# Patient Record
Sex: FEMALE | Race: BLACK OR AFRICAN AMERICAN | NOT HISPANIC OR LATINO | Employment: UNEMPLOYED | URBAN - METROPOLITAN AREA
[De-identification: names, ages, dates, MRNs, and addresses within clinical notes are randomized per-mention and may not be internally consistent; named-entity substitution may affect disease eponyms.]

---

## 2017-01-17 ENCOUNTER — HOSPITAL ENCOUNTER (EMERGENCY)
Facility: HOSPITAL | Age: 27
Discharge: HOME | End: 2017-01-17

## 2017-01-17 VITALS
BODY MASS INDEX: 28.25 KG/M2 | HEIGHT: 67 IN | DIASTOLIC BLOOD PRESSURE: 116 MMHG | TEMPERATURE: 99 F | WEIGHT: 180 LBS | HEART RATE: 88 BPM | RESPIRATION RATE: 16 BRPM | OXYGEN SATURATION: 98 % | SYSTOLIC BLOOD PRESSURE: 155 MMHG

## 2017-01-17 DIAGNOSIS — K02.9 DENTAL CARIES: Primary | ICD-10-CM

## 2017-01-17 PROCEDURE — 99283 EMERGENCY DEPT VISIT LOW MDM: CPT

## 2017-01-17 RX ORDER — ACETAMINOPHEN 500 MG
1000 TABLET ORAL 3 TIMES DAILY PRN
Qty: 30 TABLET | Refills: 0
Start: 2017-01-17

## 2017-01-17 RX ORDER — PENICILLIN V POTASSIUM 500 MG/1
500 TABLET, FILM COATED ORAL EVERY 6 HOURS
Qty: 28 TABLET | Refills: 0 | Status: SHIPPED | OUTPATIENT
Start: 2017-01-17 | End: 2017-01-27

## 2017-01-17 RX ORDER — IBUPROFEN 200 MG
400 TABLET ORAL EVERY 6 HOURS PRN
Qty: 30 TABLET | Refills: 0
Start: 2017-01-17

## 2017-01-17 RX ORDER — DICLOFENAC SODIUM 75 MG/1
75 TABLET, DELAYED RELEASE ORAL 2 TIMES DAILY
Qty: 20 TABLET | Refills: 0 | Status: SHIPPED | OUTPATIENT
Start: 2017-01-17

## 2017-01-17 NOTE — ED PROVIDER NOTES
SCRIBE #1 NOTE: I, Albert Bautista, am scribing for, and in the presence of, CHEVY Starks. I have scribed the entire note.      History      Chief Complaint   Patient presents with    Dental Pain     cracked tooth causing intense pain to right side        Review of patient's allergies indicates:  No Known Allergies     HPI   HPI    1/17/2017, 4:51 PM   History obtained from the patient      History of Present Illness: Herman Gasca is a 26 y.o. female patient who presents to the Emergency Department for right upper dental pain which onset gradually 2 days. Symptoms are constant and moderate in severity. Pt reports pain with chewing. No mitigating or exacerbating factors reported. No associated sx reported. Patient denies any fever, chills, sore throat, trouble swallowing, ear pain, HA, lightheadedness, dizziness, and all other sxs at this time. No further complaints or concerns at this time.     Arrival mode: Personal vehicle     PCP: No primary care provider on file.       Past Medical History:  Past medical history reviewed not relevant      Past Surgical History:  Past surgical history reviewed not relevant      Family History:  Family history reviewed not relevant      Social History:  Social History    Social History Main Topics    Social History Main Topics    Smoking status: Unknown if ever smoked    Smokeless tobacco: Unknown if ever used    Alcohol Use: Unknown drinking history    Drug Use: Unknown if ever used    Sexual Activity: Unknown         ROS   Review of Systems   Constitutional: Negative for chills and fever.   HENT: Positive for dental problem (right upper). Negative for ear pain, sore throat and trouble swallowing.    Respiratory: Negative for shortness of breath.    Cardiovascular: Negative for chest pain.   Gastrointestinal: Negative for nausea.   Genitourinary: Negative for dysuria.   Musculoskeletal: Negative for back pain.   Skin: Negative for rash.   Neurological:  "Negative for dizziness, weakness, light-headedness and headaches.   Hematological: Does not bruise/bleed easily.       Physical Exam    Initial Vitals   BP Pulse Resp Temp SpO2   01/17/17 1634 01/17/17 1634 01/17/17 1634 01/17/17 1634 01/17/17 1634   155/116 88 16 99 °F (37.2 °C) 98 %      Physical Exam  Nursing Notes and Vital Signs Reviewed.  Constitutional: Patient is in no acute distress. Awake and alert. Well-developed and well-nourished.  Head: Atraumatic. Normocephalic.  Eyes: PERRL. EOM intact. Conjunctivae are not pale. No scleral icterus.  ENT: Mucous membranes are moist. Oropharynx is clear and symmetric.  Mouth/Throat: No evident facial swelling. Patient handles secretions normally. Positive for dental caries to tooth #1. Positive for gingival edema around tooth #1. No palpable fluctuance. No evidence of periodontal or periapical abscess. No trismus.  Neck: Supple. Full ROM. No lymphadenopathy.  Cardiovascular: Regular rate. Regular rhythm. No murmurs, rubs, or gallops. Distal pulses are 2+ and symmetric.  Pulmonary/Chest: No respiratory distress. Clear to auscultation bilaterally. No wheezing, rales, or rhonchi.  Abdominal: Soft and non-distended.  There is no tenderness.  No rebound, guarding, or rigidity.  Good bowel sounds.    Musculoskeletal: Moves all extremities. No obvious deformities. No edema. No calf tenderness.  Skin: Warm and dry.  Neurological:  Alert, awake, and appropriate.  Normal speech.  No acute focal neurological deficits are appreciated.  Psychiatric: Normal affect. Good eye contact. Appropriate in content.    ED Course    Procedures  ED Vital Signs:  Vitals:    01/17/17 1634   BP: (!) 155/116   Pulse: 88   Resp: 16   Temp: 99 °F (37.2 °C)   TempSrc: Oral   SpO2: 98%   Weight: 81.6 kg (180 lb)   Height: 5' 7" (1.702 m)                The Emergency Provider reviewed the vital signs and test results, which are outlined above.    ED Discussion     4:55 PM:  Discussed pt dx and plan of " tx. Gave pt all f/u and return to the ED instructions. All questions and concerns were addressed at this time. Pt expresses understanding of information and instructions, and is comfortable with plan to discharge. Pt is stable for discharge.    Pre-hypertension/Hypertension: The pt has been informed that they may have pre-hypertension or hypertension based on a blood pressure reading in the ED. I recommend that the pt call the PCP listed on their discharge instructions or a physician of their choice this week to arrange f/u for further evaluation of possible pre-hypertension or hypertension.       ED Medication(s):  Medications - No data to display    New Prescriptions    ACETAMINOPHEN (TYLENOL) 500 MG TABLET    Take 2 tablets (1,000 mg total) by mouth 3 (three) times daily as needed for Pain.    DICLOFENAC (VOLTAREN) 75 MG EC TABLET    Take 1 tablet (75 mg total) by mouth 2 (two) times daily.    IBUPROFEN (ADVIL,MOTRIN) 200 MG TABLET    Take 2 tablets (400 mg total) by mouth every 6 (six) hours as needed for Pain.    PENICILLIN V POTASSIUM (VEETID) 500 MG TABLET    Take 1 tablet (500 mg total) by mouth every 6 (six) hours.       Follow-up Information     Follow up with PCP In 2 day(s).            Medical Decision Making              Scribe Attestation:   Scribe #1: I performed the above scribed service and the documentation accurately describes the services I performed. I attest to the accuracy of the note.    Attending:   Physician Attestation Statement for Scribe #1: I, CHEVY Starks, personally performed the services described in this documentation, as scribed by Albert Bautista, in my presence, and it is both accurate and complete.          Clinical Impression       ICD-10-CM ICD-9-CM   1. Dental caries K02.9 521.00       Disposition:   Disposition: Discharged  Condition: Stable           CHEVY Gage  01/17/17 1703

## 2017-01-17 NOTE — DISCHARGE INSTRUCTIONS
"    Dental Cavity    A dental cavity is a pit or crater in the surface of a tooth. This exposes the sensitive inner layer of the tooth and causes pain. If the cavity isnt treated, it will get bigger. It may cause an infection or abscess in the root of the tooth. An infection in the tooth is a much more serious problem than a cavity. You might need a root canal or the entire tooth taken out.  The pain in your tooth may be made worse by drinking hot or cold beverages. It may spread from the tooth to your ear or the area of your jaw on the same side.  Home care  Follow these tips when caring for yourself at home:  · Avoid hot and cold foods and drinks. Your tooth may be sensitive to changes in temperature.  · If your tooth is chipped or cracked, or if there is a large open cavity, put oil of cloves directly on the tooth to relieve pain. You can buy oil of cloves at drugstores. Some pharmacies carry an over-the-counter "toothache kit." This contains a paste that you can put on the exposed tooth to make it less sensitive.  · Put a cold pack on your jaw over the sore area to help reduce pain.  · You may use acetaminophen or ibuprofen to ease pain, unless another medicine was prescribed. Note: If you have chronic liver or kidney disease, talk with your health care provider before using these medicines. Also talk with your provider if youve had a stomach ulcer or GI bleeding.  · If you have signs of an infection, you will be given an antibiotic. Take it as directed.  Follow-up care  Follow up with your dentist as advised. Your pain may go away with the treatment given today. But only a dentist can fully look at and treat this problem to prevent further tooth damage.  When to seek medical advice  Call your health care provider right away if any of these occur:  · Redness or swelling of the face  · Pain gets worse or spreads to your neck  · Fever over 100.5 °F (38°C)  · Unusual drowsiness  · Headache or stiff " neck  · Weakness or fainting  · Pus drains from the tooth or gum  · Difficulty swallowing or breathing  © 3209-8478 [employer] [address] [place] [address], [place] [address]. All rights reserved. This information is not intended as a substitute for professional medical care. Always follow your healthcare professional's instructions.

## 2017-01-17 NOTE — ED AVS SNAPSHOT
[facility]  [id number] [facility] [id number]               Herman Gasca   2017  4:40 PM   ED    Description:  Female : 1990   Department:  [facility]            Your Care was Coordinated By:     Provider Role From To    CHEVY Gage Physician Assistant 17 1640 --      Reason for Visit     Dental Pain           Diagnoses this Visit        Comments    Dental caries    -  Primary       ED Disposition     ED Disposition Condition Comment    Discharge             To Do List           Follow-up Information     Follow up with PCP In 2 day(s).       These Medications        Disp Refills Start End    ibuprofen (ADVIL,MOTRIN) 200 MG tablet 30 tablet 0 2017     Take 2 tablets (400 mg total) by mouth every 6 (six) hours as needed for Pain. - Oral    acetaminophen (TYLENOL) 500 MG tablet 30 tablet 0 2017     Take 2 tablets (1,000 mg total) by mouth 3 (three) times daily as needed for Pain. - Oral    penicillin v potassium (VEETID) 500 MG tablet 28 tablet 0 2017    Take 1 tablet (500 mg total) by mouth every 6 (six) hours. - Oral    diclofenac (VOLTAREN) 75 MG EC tablet 20 tablet 0 2017     Take 1 tablet (75 mg total) by mouth 2 (two) times daily. - Oral      [facility] Care [facility] -  Assistance  Registered nurses in the [facility] provide clinical advisement, health education, appointment booking, and other advisory services.  Call for this free service at [phone].             Medications           Message regarding Medications     Verify the changes and/or additions to your medication regime listed below are the same as discussed with your clinician today.  If any of these changes or additions are incorrect, please notify your healthcare provider.        START taking these NEW medications        Refills    ibuprofen (ADVIL,MOTRIN) 200  "MG tablet 0    Sig: Take 2 tablets (400 mg total) by mouth every 6 (six) hours as needed for Pain.    Class: No Print    Route: Oral    acetaminophen (TYLENOL) 500 MG tablet 0    Sig: Take 2 tablets (1,000 mg total) by mouth 3 (three) times daily as needed for Pain.    Class: No Print    Route: Oral    penicillin v potassium (VEETID) 500 MG tablet 0    Sig: Take 1 tablet (500 mg total) by mouth every 6 (six) hours.    Class: Print    Route: Oral    diclofenac (VOLTAREN) 75 MG EC tablet 0    Sig: Take 1 tablet (75 mg total) by mouth 2 (two) times daily.    Class: Print    Route: Oral           Verify that the below list of medications is an accurate representation of the medications you are currently taking.  If none reported, the list may be blank. If incorrect, please contact your healthcare provider. Carry this list with you in case of emergency.           Current Medications     acetaminophen (TYLENOL) 500 MG tablet Take 2 tablets (1,000 mg total) by mouth 3 (three) times daily as needed for Pain.    diclofenac (VOLTAREN) 75 MG EC tablet Take 1 tablet (75 mg total) by mouth 2 (two) times daily.    ibuprofen (ADVIL,MOTRIN) 200 MG tablet Take 2 tablets (400 mg total) by mouth every 6 (six) hours as needed for Pain.    penicillin v potassium (VEETID) 500 MG tablet Take 1 tablet (500 mg total) by mouth every 6 (six) hours.           Clinical Reference Information           Your Vitals Were     BP Pulse Temp Resp Height Weight    155/116 (BP Location: Right arm, Patient Position: Sitting) 88 99 °F (37.2 °C) (Oral) 16 5' 7" (1.702 m) 81.6 kg (180 lb)    SpO2 BMI             98% 28.19 kg/m2         Allergies as of 1/17/2017     No Known Allergies      Immunizations Administered on Date of Encounter - 1/17/2017     None      ED Micro, Lab, POCT     None      ED Imaging Orders     None        Discharge Instructions           Dental Cavity    A dental cavity is a pit or crater in the surface of a tooth. This exposes the " "sensitive inner layer of the tooth and causes pain. If the cavity isnt treated, it will get bigger. It may cause an infection or abscess in the root of the tooth. An infection in the tooth is a much more serious problem than a cavity. You might need a root canal or the entire tooth taken out.  The pain in your tooth may be made worse by drinking hot or cold beverages. It may spread from the tooth to your ear or the area of your jaw on the same side.  Home care  Follow these tips when caring for yourself at home:  · Avoid hot and cold foods and drinks. Your tooth may be sensitive to changes in temperature.  · If your tooth is chipped or cracked, or if there is a large open cavity, put oil of cloves directly on the tooth to relieve pain. You can buy oil of cloves at drugstores. Some pharmacies carry an over-the-counter "toothache kit." This contains a paste that you can put on the exposed tooth to make it less sensitive.  · Put a cold pack on your jaw over the sore area to help reduce pain.  · You may use acetaminophen or ibuprofen to ease pain, unless another medicine was prescribed. Note: If you have chronic liver or kidney disease, talk with your health care provider before using these medicines. Also talk with your provider if youve had a stomach ulcer or GI bleeding.  · If you have signs of an infection, you will be given an antibiotic. Take it as directed.  Follow-up care  Follow up with your dentist as advised. Your pain may go away with the treatment given today. But only a dentist can fully look at and treat this problem to prevent further tooth damage.  When to seek medical advice  Call your health care provider right away if any of these occur:  · Redness or swelling of the face  · Pain gets worse or spreads to your neck  · Fever over 100.5 °F (38°C)  · Unusual drowsiness  · Headache or stiff neck  · Weakness or fainting  · Pus drains from the tooth or gum  · Difficulty swallowing or breathing  © " 6294-7539 [employer]. [address] [place] [address], [place] [address]. All rights reserved. This information is not intended as a substitute for professional medical care. Always follow your healthcare professional's instructions.          [facility] Sign-Up     Activating your [facility] account is as easy as 1-2-3!     1) Visit [email or web address], select Sign Up Now, enter this activation code and your date of birth, then select Next.  [email or web address]  Expires: 3/3/2017  4:53 PM      2) Create a username and password to use when you visit [facility] in the future and select a security question in case you lose your password and select Next.    3) Enter your e-mail address and click Sign Up!    Additional Information  If you have questions, please e-mail [email or web address] or call [phone] to talk to our [facility] staff. Remember, [facility] is NOT to be used for urgent needs. For medical emergencies, dial 911.         Smoking Cessation     If you would like to quit smoking:   You may be eligible for free services if you are a [place] resident and started smoking cigarettes before September 1, 1988.  Call the [employer]oking [employer]essation [employer] ([employer]) toll free at (161) 457-0164 or (366) 280-0665.   Call 7-800-QUIT-NOW if you do not meet the above criteria.             [facility] complies with applicable Federal civil rights laws and does not discriminate on the basis of race, color, national origin, age, disability, or sex.        Language Assistance Services     ATTENTION: Language assistance services are available, free of charge. Please call [phone].      ATENCIÓN: Si habla español, tiene a ogden disposición servicios gratuitos de asistencia lingüística. Llame al [phone].     CHÚ Ý: N?u b?n nói Ti?ng Vi?t, có các d?ch v? h? tr? ngôn ng? mi?n phí dành cho b?n. G?i s? [phone].